# Patient Record
Sex: FEMALE | Race: BLACK OR AFRICAN AMERICAN | NOT HISPANIC OR LATINO
[De-identification: names, ages, dates, MRNs, and addresses within clinical notes are randomized per-mention and may not be internally consistent; named-entity substitution may affect disease eponyms.]

---

## 2022-06-10 PROBLEM — Z00.00 ENCOUNTER FOR PREVENTIVE HEALTH EXAMINATION: Status: ACTIVE | Noted: 2022-06-10

## 2022-06-13 ENCOUNTER — APPOINTMENT (OUTPATIENT)
Dept: ORTHOPEDIC SURGERY | Facility: CLINIC | Age: 62
End: 2022-06-13
Payer: OTHER MISCELLANEOUS

## 2022-06-13 VITALS — HEIGHT: 67 IN | BODY MASS INDEX: 29.19 KG/M2 | WEIGHT: 186 LBS

## 2022-06-13 DIAGNOSIS — I10 ESSENTIAL (PRIMARY) HYPERTENSION: ICD-10-CM

## 2022-06-13 DIAGNOSIS — M75.121 COMPLETE ROTATOR CUFF TEAR OR RUPTURE OF RIGHT SHOULDER, NOT SPECIFIED AS TRAUMATIC: ICD-10-CM

## 2022-06-13 PROCEDURE — 99072 ADDL SUPL MATRL&STAF TM PHE: CPT

## 2022-06-13 PROCEDURE — 99455 WORK RELATED DISABILITY EXAM: CPT
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
## 2022-06-13 NOTE — WORK
[Has the patient reached Maximum Medical Improvement? If yes, indicate date___] : Yes, on [unfilled] [Is there permanent partial impairment?] : Yes [FreeTextEntry6] : 10% LEFT SHOULDER (ABDUCTION DEFICIT) [Has the patient had an injury/illness since the date of injury which impacts residual functional capacity?] : No [Would the patient benefit from vocational rehabilitation? If Yes, explain below.] :  No

## 2022-06-13 NOTE — PHYSICAL EXAM
[Right] : right shoulder [Left] : left shoulder [NL (0-70)] : full internal rotation 0-70 degrees [NL (0-180)] : full active abduction 0-180 degrees [NL (0-90)] : full external rotation 0-90 degrees [5 ___] : forward flexion 5[unfilled]/5 [5___] : internal rotation 5[unfilled]/5 [] : motor and sensory intact distally [TWNoteComboBox7] : active forward flexion 170 degrees [TWNoteComboBox4] : passive forward flexion 175 degrees [de-identified] : active abduction 155 degrees [TWNoteComboBox9] : passive abduction 155 degrees [de-identified] : external rotation at 90 degrees of abduction 95 degrees [TWNoteComboBox5] : internal rotation at 90 degrees of abduction 60 degrees [de-identified] : False